# Patient Record
Sex: FEMALE | RURAL
[De-identification: names, ages, dates, MRNs, and addresses within clinical notes are randomized per-mention and may not be internally consistent; named-entity substitution may affect disease eponyms.]

---

## 2017-10-26 ENCOUNTER — APPOINTMENT (RX ONLY)
Dept: RURAL CLINIC 1 | Facility: CLINIC | Age: 62
Setting detail: DERMATOLOGY
End: 2017-10-26

## 2017-10-26 DIAGNOSIS — L70.0 ACNE VULGARIS: ICD-10-CM

## 2017-10-26 PROBLEM — D48.5 NEOPLASM OF UNCERTAIN BEHAVIOR OF SKIN: Status: ACTIVE | Noted: 2017-10-26

## 2017-10-26 PROBLEM — I10 ESSENTIAL (PRIMARY) HYPERTENSION: Status: ACTIVE | Noted: 2017-10-26

## 2017-10-26 PROCEDURE — ? TREATMENT REGIMEN

## 2017-10-26 PROCEDURE — ? COUNSELING

## 2017-10-26 PROCEDURE — 99202 OFFICE O/P NEW SF 15 MIN: CPT | Mod: 25

## 2017-10-26 PROCEDURE — ? BIOPSY BY SHAVE METHOD

## 2017-10-26 PROCEDURE — ? OTHER

## 2017-10-26 PROCEDURE — 11100: CPT

## 2017-10-26 ASSESSMENT — LOCATION SIMPLE DESCRIPTION DERM: LOCATION SIMPLE: LEFT CHEEK

## 2017-10-26 ASSESSMENT — LOCATION ZONE DERM: LOCATION ZONE: FACE

## 2017-10-26 ASSESSMENT — LOCATION DETAILED DESCRIPTION DERM: LOCATION DETAILED: LEFT MEDIAL MALAR CHEEK

## 2017-10-26 NOTE — PROCEDURE: OTHER
Detail Level: Detailed
Other (Free Text): Area extracted with comedone extractor and debris placed in biopsy bottle
Note Text (......Xxx Chief Complaint.): This diagnosis correlates with the
Other (Free Text): Discussed tretinoin. Pt to consider

## 2017-10-26 NOTE — PROCEDURE: BIOPSY BY SHAVE METHOD
Body Location Override (Optional - Billing Will Still Be Based On Selected Body Map Location If Applicable): right lateral canthus